# Patient Record
Sex: MALE | Race: WHITE | NOT HISPANIC OR LATINO | URBAN - METROPOLITAN AREA
[De-identification: names, ages, dates, MRNs, and addresses within clinical notes are randomized per-mention and may not be internally consistent; named-entity substitution may affect disease eponyms.]

---

## 2023-03-01 ENCOUNTER — WEB ENCOUNTER (OUTPATIENT)
Dept: URBAN - METROPOLITAN AREA CLINIC 72 | Facility: CLINIC | Age: 48
End: 2023-03-01

## 2023-03-01 ENCOUNTER — OFFICE VISIT (OUTPATIENT)
Dept: URBAN - METROPOLITAN AREA CLINIC 72 | Facility: CLINIC | Age: 48
End: 2023-03-01
Payer: COMMERCIAL

## 2023-03-01 ENCOUNTER — DASHBOARD ENCOUNTERS (OUTPATIENT)
Age: 48
End: 2023-03-01

## 2023-03-01 VITALS
HEIGHT: 74 IN | HEART RATE: 92 BPM | BODY MASS INDEX: 40.43 KG/M2 | WEIGHT: 315 LBS | SYSTOLIC BLOOD PRESSURE: 148 MMHG | DIASTOLIC BLOOD PRESSURE: 104 MMHG

## 2023-03-01 DIAGNOSIS — L02.215 PERINEAL ABSCESS: ICD-10-CM

## 2023-03-01 PROCEDURE — 99204 OFFICE O/P NEW MOD 45 MIN: CPT | Performed by: INTERNAL MEDICINE

## 2023-03-01 RX ORDER — HYDROCORTISONE ACETATE 25 MG/1
SUPPOSITORY RECTAL
Qty: 14 SUPPOSITORIES | Status: ON HOLD | COMMUNITY

## 2023-03-01 RX ORDER — DICLOFENAC SODIUM 10 MG/G
APPLY TWO GRAMS TO AFFECTED AREA TOPICALLY FOUR TIMES A DAY GEL TOPICAL
Qty: 200 UNSPECIFIED | Refills: 0 | Status: ON HOLD | COMMUNITY

## 2023-03-01 RX ORDER — POLYETHYLENE GLYCOL 17 G/17G
POWDER, FOR SUSPENSION ORAL
Qty: 510 UNSPECIFIED | Status: ACTIVE | COMMUNITY

## 2023-03-01 RX ORDER — PANTOPRAZOLE SODIUM 40 MG/1
TAKE ONE TABLET BY MOUTH ONE TIME DAILY TABLET, DELAYED RELEASE ORAL
Qty: 90 UNSPECIFIED | Refills: 2 | Status: ACTIVE | COMMUNITY

## 2023-03-01 RX ORDER — PHENTERMINE HYDROCHLORIDE 37.5 MG/1
TAKE ONE TABLET BY MOUTH ONE TIME DAILY TABLET ORAL
Qty: 30 UNSPECIFIED | Refills: 0 | Status: ACTIVE | COMMUNITY

## 2023-03-01 RX ORDER — LOSARTAN POTASSIUM 100 MG/1
TAKE ONE TABLET BY MOUTH ONE TIME DAILY TABLET, FILM COATED ORAL
Qty: 90 UNSPECIFIED | Refills: 2 | Status: ACTIVE | COMMUNITY

## 2023-03-01 RX ORDER — SUCRALFATE 1 G/1
TAKE ONE TABLET BY MOUTH FOUR TIMES A DAY FOR 14 DAYS TABLET ORAL
Qty: 56 UNSPECIFIED | Refills: 0 | Status: ON HOLD | COMMUNITY

## 2023-03-01 RX ORDER — TOPIRAMATE 50 MG/1
TAKE ONE TABLET BY MOUTH ONE TIME DAILY FOR 14 DAYS THEN TAKE TWO TABLETS BY MOUTH DAILY TABLET, FILM COATED ORAL
Qty: 180 UNSPECIFIED | Refills: 0 | Status: ON HOLD | COMMUNITY

## 2023-03-01 RX ORDER — SULFAMETHOXAZOLE AND TRIMETHOPRIM 800; 160 MG/1; MG/1
TABLET ORAL
Qty: 10 TABLET | Status: ON HOLD | COMMUNITY

## 2023-03-01 RX ORDER — FOLIC ACID 1 MG/1
TAKE ONE TABLET BY MOUTH ONE TIME DAILY TABLET ORAL
Qty: 78 UNSPECIFIED | Refills: 2 | Status: ACTIVE | COMMUNITY

## 2023-03-01 NOTE — EXAM-PHYSICAL EXAM
Rectal: Purluence noted with large perirectal abscess and separate bleeding ulceration area to the left of the abscess.  JAIME: Not performed.  Chaperone present.

## 2023-03-01 NOTE — HPI-TODAY'S VISIT:
47-year-old male new to the clinic here for hemorrhoids.     Past medical history of GERD, hypertension and prior history of hemorrhoids with anal fissure rectal exam performed and noted trace thrombosed external hemorrhoid at 6:00.  MiraLAX for constipation. CT abdomen and pelvis without contrast 1/24/2023 was normal.  Labs 1/24/2023.  CBC: WBC 16.2.  CMP: Sodium 135, lipase 21.  UA small amount of blood.  1+ bacteria.  2+ mucus.  On interview today he is in the office for what he believes is hemorrhoids. Pt has history of anal fissure.  Doesn't feel like his past fissure.  Started early Janary. Pt went to [facility] 1/24/23 and was in waiting room and he believes while he was waiting whatever it was "busted." Pt was sent back with prescription for Miralax and suppositories. He continues to have "oozing" and some blood from time to time. He called Dr Membreno who told him his results from the hospital showed he had a infection and was suppose to start on antibiotic and wasnt given any so she started him on some. Pt finished antibiotic that PCP prescribed but still having oozing and doesnt feel back to normal. Has discharge on his undergarmets.  Takes miralax nightly and has formed BM daily without strainig.  Tried prep H cream but feels that made it worse.  Sitz bath made it worse.  Has rectal pain with and without BM.  Pantoprazole controls his GERD.  He has been on it for 2 years.  He is a .     Anala fissure 2017 at  by Dr. Espinosa. Had botox and creams prior to his surgery which were not effective.    Dr. Munoz did a colonoscopy in 2017 was normal per patient

## 2023-03-02 ENCOUNTER — TELEPHONE ENCOUNTER (OUTPATIENT)
Dept: URBAN - METROPOLITAN AREA CLINIC 72 | Facility: CLINIC | Age: 48
End: 2023-03-02

## 2023-03-02 ENCOUNTER — OFFICE VISIT (OUTPATIENT)
Dept: URBAN - METROPOLITAN AREA CLINIC 72 | Facility: CLINIC | Age: 48
End: 2023-03-02

## 2023-03-02 RX ORDER — DICLOFENAC SODIUM 10 MG/G
APPLY TWO GRAMS TO AFFECTED AREA TOPICALLY FOUR TIMES A DAY GEL TOPICAL
Qty: 200 UNSPECIFIED | Refills: 0 | Status: ON HOLD | COMMUNITY

## 2023-03-02 RX ORDER — LOSARTAN POTASSIUM 100 MG/1
TAKE ONE TABLET BY MOUTH ONE TIME DAILY TABLET, FILM COATED ORAL
Qty: 90 UNSPECIFIED | Refills: 2 | Status: ACTIVE | COMMUNITY

## 2023-03-02 RX ORDER — SULFAMETHOXAZOLE AND TRIMETHOPRIM 800; 160 MG/1; MG/1
TABLET ORAL
Qty: 10 TABLET | Status: ON HOLD | COMMUNITY

## 2023-03-02 RX ORDER — TOPIRAMATE 50 MG/1
TAKE ONE TABLET BY MOUTH ONE TIME DAILY FOR 14 DAYS THEN TAKE TWO TABLETS BY MOUTH DAILY TABLET, FILM COATED ORAL
Qty: 180 UNSPECIFIED | Refills: 0 | Status: ON HOLD | COMMUNITY

## 2023-03-02 RX ORDER — PHENTERMINE HYDROCHLORIDE 37.5 MG/1
TAKE ONE TABLET BY MOUTH ONE TIME DAILY TABLET ORAL
Qty: 30 UNSPECIFIED | Refills: 0 | Status: ACTIVE | COMMUNITY

## 2023-03-02 RX ORDER — PANTOPRAZOLE SODIUM 40 MG/1
TAKE ONE TABLET BY MOUTH ONE TIME DAILY TABLET, DELAYED RELEASE ORAL
Qty: 90 UNSPECIFIED | Refills: 2 | Status: ACTIVE | COMMUNITY

## 2023-03-02 RX ORDER — SUCRALFATE 1 G/1
TAKE ONE TABLET BY MOUTH FOUR TIMES A DAY FOR 14 DAYS TABLET ORAL
Qty: 56 UNSPECIFIED | Refills: 0 | Status: ON HOLD | COMMUNITY

## 2023-03-02 RX ORDER — FOLIC ACID 1 MG/1
TAKE ONE TABLET BY MOUTH ONE TIME DAILY TABLET ORAL
Qty: 78 UNSPECIFIED | Refills: 2 | Status: ACTIVE | COMMUNITY

## 2023-03-02 RX ORDER — HYDROCORTISONE ACETATE 25 MG/1
SUPPOSITORY RECTAL
Qty: 14 SUPPOSITORIES | Status: ON HOLD | COMMUNITY

## 2023-03-02 RX ORDER — POLYETHYLENE GLYCOL 17 G/17G
POWDER, FOR SUSPENSION ORAL
Qty: 510 UNSPECIFIED | Status: ACTIVE | COMMUNITY